# Patient Record
Sex: MALE | Race: WHITE | Employment: OTHER | URBAN - METROPOLITAN AREA
[De-identification: names, ages, dates, MRNs, and addresses within clinical notes are randomized per-mention and may not be internally consistent; named-entity substitution may affect disease eponyms.]

---

## 2017-06-15 ENCOUNTER — OFFICE VISIT (OUTPATIENT)
Dept: CARDIOLOGY CLINIC | Age: 72
End: 2017-06-15

## 2017-06-15 VITALS
HEIGHT: 67 IN | WEIGHT: 167.2 LBS | BODY MASS INDEX: 26.24 KG/M2 | DIASTOLIC BLOOD PRESSURE: 70 MMHG | HEART RATE: 51 BPM | OXYGEN SATURATION: 94 % | SYSTOLIC BLOOD PRESSURE: 125 MMHG

## 2017-06-15 DIAGNOSIS — I49.3 PVC'S (PREMATURE VENTRICULAR CONTRACTIONS): ICD-10-CM

## 2017-06-15 DIAGNOSIS — E78.00 PURE HYPERCHOLESTEROLEMIA: ICD-10-CM

## 2017-06-15 DIAGNOSIS — I50.22 CHRONIC SYSTOLIC CONGESTIVE HEART FAILURE (HCC): ICD-10-CM

## 2017-06-15 DIAGNOSIS — I25.10 CORONARY ARTERY DISEASE INVOLVING NATIVE CORONARY ARTERY OF NATIVE HEART WITHOUT ANGINA PECTORIS: Primary | ICD-10-CM

## 2017-06-15 DIAGNOSIS — I42.9 CARDIOMYOPATHY (HCC): ICD-10-CM

## 2017-06-15 DIAGNOSIS — I10 ESSENTIAL HYPERTENSION: ICD-10-CM

## 2017-06-15 DIAGNOSIS — J43.1 PANLOBULAR EMPHYSEMA (HCC): ICD-10-CM

## 2017-06-15 PROCEDURE — 3017F COLORECTAL CA SCREEN DOC REV: CPT | Performed by: INTERNAL MEDICINE

## 2017-06-15 PROCEDURE — 1036F TOBACCO NON-USER: CPT | Performed by: INTERNAL MEDICINE

## 2017-06-15 PROCEDURE — 3023F SPIROM DOC REV: CPT | Performed by: INTERNAL MEDICINE

## 2017-06-15 PROCEDURE — G8419 CALC BMI OUT NRM PARAM NOF/U: HCPCS | Performed by: INTERNAL MEDICINE

## 2017-06-15 PROCEDURE — G8598 ASA/ANTIPLAT THER USED: HCPCS | Performed by: INTERNAL MEDICINE

## 2017-06-15 PROCEDURE — 99214 OFFICE O/P EST MOD 30 MIN: CPT | Performed by: INTERNAL MEDICINE

## 2017-06-15 PROCEDURE — G8427 DOCREV CUR MEDS BY ELIG CLIN: HCPCS | Performed by: INTERNAL MEDICINE

## 2017-06-15 PROCEDURE — 4040F PNEUMOC VAC/ADMIN/RCVD: CPT | Performed by: INTERNAL MEDICINE

## 2017-06-15 PROCEDURE — 1123F ACP DISCUSS/DSCN MKR DOCD: CPT | Performed by: INTERNAL MEDICINE

## 2017-06-15 PROCEDURE — G8926 SPIRO NO PERF OR DOC: HCPCS | Performed by: INTERNAL MEDICINE

## 2017-06-15 RX ORDER — LEVOTHYROXINE SODIUM 137 UG/1
137 TABLET ORAL
COMMUNITY
End: 2017-11-08 | Stop reason: DRUGHIGH

## 2017-06-15 RX ORDER — AMLODIPINE BESYLATE AND BENAZEPRIL HYDROCHLORIDE 5; 20 MG/1; MG/1
1 CAPSULE ORAL DAILY
COMMUNITY

## 2017-06-15 RX ORDER — FUROSEMIDE 20 MG/1
20 TABLET ORAL DAILY
Qty: 30 TABLET | Refills: 6 | Status: SHIPPED | OUTPATIENT
Start: 2017-06-15 | End: 2017-09-18 | Stop reason: SDUPTHER

## 2017-06-15 RX ORDER — ATORVASTATIN CALCIUM 40 MG/1
40 TABLET, FILM COATED ORAL DAILY
COMMUNITY

## 2017-06-15 RX ORDER — ALBUTEROL SULFATE 90 UG/1
2 AEROSOL, METERED RESPIRATORY (INHALATION) EVERY 6 HOURS PRN
COMMUNITY

## 2017-06-15 RX ORDER — LEVOTHYROXINE SODIUM 0.12 MG/1
125 TABLET ORAL WEEKLY
COMMUNITY

## 2017-06-15 RX ORDER — ALBUTEROL SULFATE 2.5 MG/3ML
2.5 SOLUTION RESPIRATORY (INHALATION) EVERY 6 HOURS PRN
COMMUNITY

## 2017-06-15 RX ORDER — CLOPIDOGREL BISULFATE 75 MG/1
75 TABLET ORAL DAILY
COMMUNITY

## 2017-06-15 RX ORDER — METOPROLOL SUCCINATE 25 MG/1
25 TABLET, EXTENDED RELEASE ORAL DAILY
Qty: 30 TABLET | Refills: 6 | Status: SHIPPED | OUTPATIENT
Start: 2017-06-15 | End: 2017-11-14 | Stop reason: SDUPTHER

## 2017-06-15 ASSESSMENT — ENCOUNTER SYMPTOMS
BLOOD IN STOOL: 0
WHEEZING: 0
EYE REDNESS: 0
ABDOMINAL DISTENTION: 0
COUGH: 0
SHORTNESS OF BREATH: 0

## 2017-09-18 RX ORDER — FUROSEMIDE 20 MG/1
20 TABLET ORAL DAILY
Qty: 30 TABLET | Refills: 0 | Status: SHIPPED | OUTPATIENT
Start: 2017-09-18 | End: 2017-10-02 | Stop reason: SDUPTHER

## 2017-10-02 ENCOUNTER — OFFICE VISIT (OUTPATIENT)
Dept: CARDIOLOGY CLINIC | Age: 72
End: 2017-10-02

## 2017-10-02 VITALS
SYSTOLIC BLOOD PRESSURE: 128 MMHG | BODY MASS INDEX: 25.52 KG/M2 | HEIGHT: 67 IN | HEART RATE: 36 BPM | OXYGEN SATURATION: 91 % | WEIGHT: 162.6 LBS | DIASTOLIC BLOOD PRESSURE: 64 MMHG

## 2017-10-02 DIAGNOSIS — I49.3 PVC'S (PREMATURE VENTRICULAR CONTRACTIONS): ICD-10-CM

## 2017-10-02 DIAGNOSIS — I50.22 CHRONIC SYSTOLIC CONGESTIVE HEART FAILURE (HCC): ICD-10-CM

## 2017-10-02 DIAGNOSIS — I10 ESSENTIAL HYPERTENSION: ICD-10-CM

## 2017-10-02 DIAGNOSIS — I25.10 CORONARY ARTERY DISEASE INVOLVING NATIVE CORONARY ARTERY OF NATIVE HEART WITHOUT ANGINA PECTORIS: ICD-10-CM

## 2017-10-02 DIAGNOSIS — E78.00 PURE HYPERCHOLESTEROLEMIA: Primary | ICD-10-CM

## 2017-10-02 PROCEDURE — G8598 ASA/ANTIPLAT THER USED: HCPCS | Performed by: INTERNAL MEDICINE

## 2017-10-02 PROCEDURE — G8484 FLU IMMUNIZE NO ADMIN: HCPCS | Performed by: INTERNAL MEDICINE

## 2017-10-02 PROCEDURE — G8427 DOCREV CUR MEDS BY ELIG CLIN: HCPCS | Performed by: INTERNAL MEDICINE

## 2017-10-02 PROCEDURE — 4040F PNEUMOC VAC/ADMIN/RCVD: CPT | Performed by: INTERNAL MEDICINE

## 2017-10-02 PROCEDURE — 1123F ACP DISCUSS/DSCN MKR DOCD: CPT | Performed by: INTERNAL MEDICINE

## 2017-10-02 PROCEDURE — 3017F COLORECTAL CA SCREEN DOC REV: CPT | Performed by: INTERNAL MEDICINE

## 2017-10-02 PROCEDURE — 99214 OFFICE O/P EST MOD 30 MIN: CPT | Performed by: INTERNAL MEDICINE

## 2017-10-02 PROCEDURE — 1036F TOBACCO NON-USER: CPT | Performed by: INTERNAL MEDICINE

## 2017-10-02 PROCEDURE — 93000 ELECTROCARDIOGRAM COMPLETE: CPT | Performed by: INTERNAL MEDICINE

## 2017-10-02 PROCEDURE — G8417 CALC BMI ABV UP PARAM F/U: HCPCS | Performed by: INTERNAL MEDICINE

## 2017-10-02 RX ORDER — FUROSEMIDE 20 MG/1
20 TABLET ORAL 2 TIMES DAILY
Qty: 60 TABLET | Refills: 6 | Status: SHIPPED | OUTPATIENT
Start: 2017-10-02 | End: 2018-04-28 | Stop reason: SDUPTHER

## 2017-10-02 ASSESSMENT — ENCOUNTER SYMPTOMS
BLOOD IN STOOL: 0
ABDOMINAL DISTENTION: 0
SHORTNESS OF BREATH: 0
COUGH: 0
EYE REDNESS: 0
WHEEZING: 0

## 2017-10-02 NOTE — PATIENT INSTRUCTIONS
Avoiding Triggers With Heart Failure: Care Instructions  Your Care Instructions  Triggers are anything that make your heart failure flare up. A flare-up is also called \"sudden heart failure\" or \"acute heart failure. \" When you have a flare-up, fluid builds up in your lungs, and you have problems breathing. You might need to go to the hospital. By watching for changes in your condition and avoiding triggers, you can prevent heart failure flare-ups. Follow-up care is a key part of your treatment and safety. Be sure to make and go to all appointments, and call your doctor if you are having problems. It's also a good idea to know your test results and keep a list of the medicines you take. How can you care for yourself at home? Watch for changes in your weight and condition  · Weigh yourself without clothing at the same time each day. Record your weight. Call your doctor if you have sudden weight gain, such as more than 2 to 3 pounds in a day or 5 pounds in a week. (Your doctor may suggest a different range of weight gain.) A sudden weight gain may mean that your heart failure is getting worse. · Keep a daily record of your symptoms. Write down any changes in how you feel, such as new shortness of breath, cough, or problems eating. Also record if your ankles are more swollen than usual and if you feel more tired than usual. Note anything that you ate or did that could have triggered these changes. Limit sodium  Sodium causes your body to hold on to extra water. This may cause your heart failure symptoms to get worse. People get most of their sodium from processed foods. Fast food and restaurant meals also tend to be very high in sodium. · Your doctor may suggest that you limit sodium to 2,000 milligrams (mg) a day or less. That is less than 1 teaspoon of salt a day, including all the salt you eat in cooking or in packaged foods. · Read food labels on cans and food packages.  They tell you how much sodium you get in one serving. Check the serving size. If you eat more than one serving, you are getting more sodium. · Be aware that sodium can come in forms other than salt, including monosodium glutamate (MSG), sodium citrate, and sodium bicarbonate (baking soda). MSG is often added to Asian food. You can sometimes ask for food without MSG or salt. · Slowly reducing salt will help you adjust to the taste. Take the salt shaker off the table. · Flavor your food with garlic, lemon juice, onion, vinegar, herbs, and spices instead of salt. Do not use soy sauce, steak sauce, onion salt, garlic salt, mustard, or ketchup on your food, unless it is labeled \"low-sodium\" or \"low-salt. \"  · Make your own salad dressings, sauces, and ketchup without adding salt. · Use fresh or frozen ingredients, instead of canned ones, whenever you can. Choose low-sodium canned goods. · Eat less processed food and food from restaurants, including fast food. Exercise as directed  Moderate, regular exercise is very good for your heart. It improves your blood flow and helps control your weight. But too much exercise can stress your heart and cause a heart failure flare-up. · Check with your doctor before you start an exercise program.  · Walking is an easy way to get exercise. Start out slowly. Gradually increase the length and pace of your walk. Swimming, riding a bike, and using a treadmill are also good forms of exercise. · When you exercise, watch for signs that your heart is working too hard. You are pushing yourself too hard if you cannot talk while you are exercising. If you become short of breath or dizzy or have chest pain, stop, sit down, and rest.  · Do not exercise when you do not feel well. Take medicines correctly  · Take your medicines exactly as prescribed. Call your doctor if you think you are having a problem with your medicine. · Make a list of all the medicines you take.  Include those prescribed to you by other doctors and any over-the-counter medicines, vitamins, or supplements you take. Take this list with you when you go to any doctor. · Take your medicines at the same time every day. It may help you to post a list of all the medicines you take every day and what time of day you take them. · Make taking your medicine as simple as you can. Plan times to take your medicines when you are doing other things, such as eating a meal or getting ready for bed. This will make it easier to remember to take your medicines. · Get organized. Use helpful tools, such as daily or weekly pill containers. When should you call for help? Call 911 if you have symptoms of sudden heart failure such as:  · You have severe trouble breathing. · You cough up pink, foamy mucus. · You have a new irregular or rapid heartbeat. Call your doctor now or seek immediate medical care if:  · You have new or increased shortness of breath. · You are dizzy or lightheaded, or you feel like you may faint. · You have sudden weight gain, such as more than 2 to 3 pounds in a day or 5 pounds in a week. (Your doctor may suggest a different range of weight gain.)  · You have increased swelling in your legs, ankles, or feet. · You are suddenly so tired or weak that you cannot do your usual activities. Watch closely for changes in your health, and be sure to contact your doctor if you develop new symptoms. Where can you learn more? Go to https://[employer]. org and sign in to your "[employer] account. Enter X279 in the [employer] box to learn more about \"Avoiding Triggers With Heart Failure: Care Instructions. \"     If you do not have an account, please click on the \"Sign Up Now\" link. Current as of: February 23, 2017  Content Version: 11.3  © 7091-7890 [employer]. Care instructions adapted under license by [facility] ([facility]).  If you have questions about a medical condition or this instruction, always ask your healthcare professional.

## 2017-10-02 NOTE — LETTER
[address] Cardiology - [address] [facility] [address]  [address]   [address] [address]  Phone: [phone]  Fax: [phone]    Mady Palacio MD        October 4, 2017     Shaneka Judge MD, MD  [address] [facility]  [address] [address]    Patient: Bessy Jean Baptiste  MR Number: [id number]  YOB: 1945  Date of Visit: 10/2/2017    Dear Dr. Shaneka Judge MD:    HPI He states that he was hospitalized overnight at the [facility] for COPD exacerbation and possible CHF. He states that he was on vacation and not watching his diet or weighing himself. Troponin was normal. ProBNP 629. He denies chest pain, palpitations, dizziness, syncope, leg swelling and worsening chronic dyspnea. Assessment:       CAD (coronary artery disease)     s/p CABG X4 TCH 1994.  Cardiomyopathy ([address]     Echo 6/2017 LVEF 40-45%, grade I DD, mildly reduced RVSF, mild MR.     CHF (congestive heart failure) (HCC)     chronic systolic. ProBNP 3060. CXR 5/2017 mild CHF. ProBNP 910 9/2017    COPD (chronic obstructive pulmonary disease) (HCC)     Severe. Followed by Dr. Victor M Gordon.  HLD (hyperlipidemia)     taking statin. LDL <40    HTN (hypertension)- controlled.  PVC's (premature ventricular contractions)     asymptomatic. Mg, TSH levels normal. K level normal. EKG 10/2017 NSR, bigeminal PVC's. Consider ablation. SVT while hospitalized. Plan:      CHF appears to be compensated. No angina. HTN controlled. Advised pt to weigh himself daily and limit salt intake. Advised to increase lasix to BID prn for weight increase. Will arrange 24 hour HM to evaluate PVC burden. Fasting lipid profile, CMP, proBNP prior to next visit. If you have questions, please do not hesitate to call me. I look forward to following Lance Dubon along with you.     Sincerely,        Mady Palacio MD

## 2017-10-02 NOTE — MR AVS SNAPSHOT
Additional Information about your Body Mass Index (BMI)           Your BMI as listed above is considered overweight (25.0-29.9). BMI is an estimate of body fat, calculated from your height and weight. The higher your BMI, the greater your risk of heart disease, high blood pressure, type 2 diabetes, stroke, gallstones, arthritis, sleep apnea, and certain cancers. BMI is not perfect. It may overestimate body fat in athletes and people who are more muscular. If your body fat is high you can improve your BMI by decreasing your calorie intake and becoming more physically active. Learn more at: [email or web address]          Instructions         Avoiding Triggers With Heart Failure: Care Instructions  Your Care Instructions  Triggers are anything that make your heart failure flare up. A flare-up is also called \"sudden heart failure\" or \"acute heart failure. \" When you have a flare-up, fluid builds up in your lungs, and you have problems breathing. You might need to go to the hospital. By watching for changes in your condition and avoiding triggers, you can prevent heart failure flare-ups. Follow-up care is a key part of your treatment and safety. Be sure to make and go to all appointments, and call your doctor if you are having problems. It's also a good idea to know your test results and keep a list of the medicines you take. How can you care for yourself at home? Watch for changes in your weight and condition  · Weigh yourself without clothing at the same time each day. Record your weight. Call your doctor if you have sudden weight gain, such as more than 2 to 3 pounds in a day or 5 pounds in a week. (Your doctor may suggest a different range of weight gain.) A sudden weight gain may mean that your heart failure is getting worse. · Keep a daily record of your symptoms. Write down any changes in how you feel, such as new shortness of breath, cough, or problems eating.  Also record if your ankles are more swollen than usual and if you feel more tired than usual. Note anything that you ate or did that could have triggered these changes. Limit sodium  Sodium causes your body to hold on to extra water. This may cause your heart failure symptoms to get worse. People get most of their sodium from processed foods. Fast food and restaurant meals also tend to be very high in sodium. · Your doctor may suggest that you limit sodium to 2,000 milligrams (mg) a day or less. That is less than 1 teaspoon of salt a day, including all the salt you eat in cooking or in packaged foods. · Read food labels on cans and food packages. They tell you how much sodium you get in one serving. Check the serving size. If you eat more than one serving, you are getting more sodium. · Be aware that sodium can come in forms other than salt, including monosodium glutamate (MSG), sodium citrate, and sodium bicarbonate (baking soda). MSG is often added to Asian food. You can sometimes ask for food without MSG or salt. · Slowly reducing salt will help you adjust to the taste. Take the salt shaker off the table. · Flavor your food with garlic, lemon juice, onion, vinegar, herbs, and spices instead of salt. Do not use soy sauce, steak sauce, onion salt, garlic salt, mustard, or ketchup on your food, unless it is labeled \"low-sodium\" or \"low-salt. \"  · Make your own salad dressings, sauces, and ketchup without adding salt. · Use fresh or frozen ingredients, instead of canned ones, whenever you can. Choose low-sodium canned goods. · Eat less processed food and food from restaurants, including fast food. Exercise as directed  Moderate, regular exercise is very good for your heart. It improves your blood flow and helps control your weight. But too much exercise can stress your heart and cause a heart failure flare-up.   · Check with your doctor before you start an exercise program. · Walking is an easy way to get exercise. Start out slowly. Gradually increase the length and pace of your walk. Swimming, riding a bike, and using a treadmill are also good forms of exercise. · When you exercise, watch for signs that your heart is working too hard. You are pushing yourself too hard if you cannot talk while you are exercising. If you become short of breath or dizzy or have chest pain, stop, sit down, and rest.  · Do not exercise when you do not feel well. Take medicines correctly  · Take your medicines exactly as prescribed. Call your doctor if you think you are having a problem with your medicine. · Make a list of all the medicines you take. Include those prescribed to you by other doctors and any over-the-counter medicines, vitamins, or supplements you take. Take this list with you when you go to any doctor. · Take your medicines at the same time every day. It may help you to post a list of all the medicines you take every day and what time of day you take them. · Make taking your medicine as simple as you can. Plan times to take your medicines when you are doing other things, such as eating a meal or getting ready for bed. This will make it easier to remember to take your medicines. · Get organized. Use helpful tools, such as daily or weekly pill containers. When should you call for help? Call 911 if you have symptoms of sudden heart failure such as:  · You have severe trouble breathing. · You cough up pink, foamy mucus. · You have a new irregular or rapid heartbeat. Call your doctor now or seek immediate medical care if:  · You have new or increased shortness of breath. · You are dizzy or lightheaded, or you feel like you may faint. · You have sudden weight gain, such as more than 2 to 3 pounds in a day or 5 pounds in a week. (Your doctor may suggest a different range of weight gain.)  · You have increased swelling in your legs, ankles, or feet. furosemide (LASIX) 20 MG tablet Take 1 tablet by mouth 2 times daily    albuterol (PROVENTIL) (2.5 MG/3ML) 0.083% nebulizer solution Take 2.5 mg by nebulization every 6 hours as needed for Wheezing    albuterol sulfate  (90 Base) MCG/ACT inhaler Inhale 2 puffs into the lungs every 6 hours as needed for Wheezing    Roflumilast (DALIRESP PO) Take by mouth    clopidogrel (PLAVIX) 75 MG tablet Take 75 mg by mouth daily    levothyroxine (SYNTHROID) 137 MCG tablet Take 137 mcg by mouth 5 days a week    levothyroxine (SYNTHROID) 125 MCG tablet Take 125 mcg by mouth 2 days a week    atorvastatin (LIPITOR) 40 MG tablet Take 40 mg by mouth daily    amLODIPine-benazepril (LOTREL) 5-20 MG per capsule Take 1 capsule by mouth daily    aspirin 81 MG tablet Take 81 mg by mouth daily    metoprolol succinate (TOPROL XL) 25 MG extended release tablet Take 1 tablet by mouth daily      Allergies              Tetanus Toxoids       We Ordered/Performed the following           EKG 12 Lead          Result Summary for EKG 12 Lead      Result Information     Status          Edited Result - FINAL (Resulted: 10/2/2017  4:21 PM)           10/2/2017  4:30 PM      Scans on Order [id number]            ECG on 10/2/2017  4:22 PM : ECG Report                     Additional Information        Basic Information     Date Of Birth Sex Race Ethnicity Preferred Language    1945 Male White Non-/Non  English      Problem List as of 10/2/2017  Date Reviewed: 10/2/2017                CAD (coronary artery disease)    Cardiomyopathy (HCC)    CHF (congestive heart failure) (HCC)    COPD (chronic obstructive pulmonary disease) (HCC)    HLD (hyperlipidemia)    HTN (hypertension)    PVC's (premature ventricular contractions)      Preventive Care        Date Due    One-time abdominal aortic aneurism (AAA) screening is recommended for all men between the age of 73-68 who have ever smoked 1945

## 2017-10-02 NOTE — PROGRESS NOTES
Subjective:      Patient ID: Zaida Velez is a 71 y.o. male. Reason for visit: f/u CHF  CC: \"I was in the hospital for SOB. \"    HPI He states that he was hospitalized overnight at the [address] [place] [address] for COPD exacerbation and possible CHF. He states that he was on vacation and not watching his diet or weighing himself. Troponin was normal. ProBNP 629. He denies chest pain, palpitations, dizziness, syncope, leg swelling and worsening chronic dyspnea. Review of Systems   Constitutional: Negative for activity change, appetite change, chills, fatigue, fever and unexpected weight change. HENT: Negative for congestion, nosebleeds and tinnitus. Eyes: Negative for redness and visual disturbance. Respiratory: Negative for cough, shortness of breath and wheezing. Cardiovascular: Negative for chest pain, palpitations and leg swelling. Gastrointestinal: Negative for abdominal distention and blood in stool. Genitourinary: Negative for dysuria and hematuria. Musculoskeletal: Negative for gait problem and myalgias. Neurological: Negative for dizziness and speech difficulty. Hematological: Does not bruise/bleed easily. Psychiatric/Behavioral: Negative for behavioral problems and confusion. All other systems reviewed and are negative. Objective:   Physical Exam   Constitutional: He is oriented to person, place, and time. He appears well-developed and well-nourished. HENT:   Head: Normocephalic and atraumatic. Eyes: Conjunctivae are normal. Right eye exhibits no discharge. Left eye exhibits no discharge. Neck: Normal range of motion. Neck supple. Cardiovascular: Normal rate, regular rhythm, normal heart sounds and intact distal pulses. ectopy   Pulmonary/Chest: Effort normal.   Crackles bases   Abdominal: Soft. Bowel sounds are normal.   Musculoskeletal: Normal range of motion. He exhibits no edema. Neurological: He is alert and oriented to person, place, and time.    Skin:

## 2017-10-04 NOTE — COMMUNICATION BODY
HPI He states that he was hospitalized overnight at the [address] [place] [address] for COPD exacerbation and possible CHF. He states that he was on vacation and not watching his diet or weighing himself. Troponin was normal. ProBNP 629. He denies chest pain, palpitations, dizziness, syncope, leg swelling and worsening chronic dyspnea. Assessment:       CAD (coronary artery disease)     s/p CABG X4 TCH 1994.  Cardiomyopathy ([address]     Echo 6/2017 LVEF 40-45%, grade I DD, mildly reduced RVSF, mild MR.     CHF (congestive heart failure) (HCC)     chronic systolic. ProBNP 3060. CXR 5/2017 mild CHF. ProBNP 910 9/2017    COPD (chronic obstructive pulmonary disease) ([facility])     Severe. Followed by Dr. Raquel Cooks.  HLD (hyperlipidemia)     taking statin. LDL <40    HTN (hypertension)- controlled.  PVC's (premature ventricular contractions)     asymptomatic. Mg, TSH levels normal. K level normal. EKG 10/2017 NSR, bigeminal PVC's. Consider ablation. SVT while hospitalized. Plan:      CHF appears to be compensated. No angina. HTN controlled. Advised pt to weigh himself daily and limit salt intake. Advised to increase lasix to BID prn for weight increase. Will arrange 24 hour HM to evaluate PVC burden. Fasting lipid profile, CMP, proBNP prior to next visit.

## 2017-10-23 ENCOUNTER — OFFICE VISIT (OUTPATIENT)
Dept: CARDIOLOGY CLINIC | Age: 72
End: 2017-10-23

## 2017-10-23 VITALS
WEIGHT: 165 LBS | DIASTOLIC BLOOD PRESSURE: 62 MMHG | SYSTOLIC BLOOD PRESSURE: 124 MMHG | HEIGHT: 67 IN | BODY MASS INDEX: 25.9 KG/M2 | HEART RATE: 66 BPM | OXYGEN SATURATION: 92 %

## 2017-10-23 DIAGNOSIS — I25.10 CORONARY ARTERY DISEASE WITH HX OF MYOCARDIAL INFARCT W/O HX OF CABG: ICD-10-CM

## 2017-10-23 DIAGNOSIS — I50.22 CHRONIC SYSTOLIC HF (HEART FAILURE) (HCC): ICD-10-CM

## 2017-10-23 DIAGNOSIS — I10 ESSENTIAL HYPERTENSION: ICD-10-CM

## 2017-10-23 DIAGNOSIS — I25.2 CORONARY ARTERY DISEASE WITH HX OF MYOCARDIAL INFARCT W/O HX OF CABG: ICD-10-CM

## 2017-10-23 DIAGNOSIS — R06.02 SOB (SHORTNESS OF BREATH): ICD-10-CM

## 2017-10-23 DIAGNOSIS — I49.3 PVC (PREMATURE VENTRICULAR CONTRACTION): Primary | ICD-10-CM

## 2017-10-23 DIAGNOSIS — I25.5 ISCHEMIC CARDIOMYOPATHY: ICD-10-CM

## 2017-10-23 DIAGNOSIS — E78.2 MIXED HYPERLIPIDEMIA: ICD-10-CM

## 2017-10-23 PROCEDURE — 1123F ACP DISCUSS/DSCN MKR DOCD: CPT | Performed by: INTERNAL MEDICINE

## 2017-10-23 PROCEDURE — 3017F COLORECTAL CA SCREEN DOC REV: CPT | Performed by: INTERNAL MEDICINE

## 2017-10-23 PROCEDURE — G8427 DOCREV CUR MEDS BY ELIG CLIN: HCPCS | Performed by: INTERNAL MEDICINE

## 2017-10-23 PROCEDURE — G8598 ASA/ANTIPLAT THER USED: HCPCS | Performed by: INTERNAL MEDICINE

## 2017-10-23 PROCEDURE — 99205 OFFICE O/P NEW HI 60 MIN: CPT | Performed by: INTERNAL MEDICINE

## 2017-10-23 PROCEDURE — G8484 FLU IMMUNIZE NO ADMIN: HCPCS | Performed by: INTERNAL MEDICINE

## 2017-10-23 PROCEDURE — G8417 CALC BMI ABV UP PARAM F/U: HCPCS | Performed by: INTERNAL MEDICINE

## 2017-10-23 PROCEDURE — 1036F TOBACCO NON-USER: CPT | Performed by: INTERNAL MEDICINE

## 2017-10-23 PROCEDURE — 4040F PNEUMOC VAC/ADMIN/RCVD: CPT | Performed by: INTERNAL MEDICINE

## 2017-10-23 PROCEDURE — 93000 ELECTROCARDIOGRAM COMPLETE: CPT | Performed by: INTERNAL MEDICINE

## 2017-10-23 NOTE — PATIENT INSTRUCTIONS
while.  Follow-up care is a key part of your treatment and safety. Be sure to make and go to all appointments, and call your doctor if you are having problems. It's also a good idea to know your test results and keep a list of the medicines you take. Where can you learn more? Go to https://chpepiceweb.mapp2link. org and sign in to your Semetric account. Enter A954 in the [facility] box to learn more about \"Learning About Catheter Ablation for Heart Rhythm Problems. \"     If you do not have an account, please click on the \"Sign Up Now\" link. Current as of: July 28, 2016  Content Version: 11.3  © 3053-2590 [employer]. Care instructions adapted under license by [facility] ([facility]). If you have questions about a medical condition or this instruction, always ask your healthcare professional. [place] [address] any warranty or liability for your use of this information. Patient Education        Electrophysiology Study and Catheter Ablation: What to Expect at [address] [place] [address] had an electrophysiology study for a problem with your heartbeat. You may also have had a catheter ablation to try to correct the problem. You may have swelling, bruising, or a small lump around the site where the catheters went into your body. These should go away in 3 to 4 weeks. Do not exercise hard or lift anything heavy for a week. You may be able to go back to work and to your normal routine in 1 or 2 days. This care sheet gives you a general idea about how long it will take for you to recover. But each person recovers at a different pace. Follow the steps below to get better as quickly as possible. How can you care for yourself at home? Activity  · For 1 week, do not lift anything that would make you strain. This may include heavy grocery bags and milk containers, a heavy briefcase or backpack, cat litter or dog food bags, a vacuum , or a child.   · For 1 week, do not exercise hard or do any activity that could strain your blood vessels or the site where the catheters went into your body. · Ask your doctor when it is okay to have sex. · You may shower 24 to 48 hours after the procedure, if your doctor okays it. Pat the incision dry. Do not take a bath for 1 week, or until your doctor tells you it is okay. Diet  · You can eat your normal diet. If your stomach is upset, try bland, low-fat foods like plain rice, broiled chicken, toast, and yogurt. · Drink plenty of fluids (unless your doctor tells you not to). Medicines  · Your doctor will tell you if and when you can restart your medicines. He or she will also give you instructions about taking any new medicines. · If you take blood thinners, such as warfarin (Coumadin), clopidogrel (Plavix), or aspirin, be sure to talk to your doctor. He or she will tell you if and when to start taking those medicines again. Make sure that you understand exactly what your doctor wants you to do. · Ask your doctor if you can take acetaminophen (Tylenol) for pain. Do not take aspirin for 3 days, unless your doctor says it is okay. · Check with your doctor before you take aspirin or anti-inflammatory medicines to reduce pain and swelling. These include ibuprofen (Advil, Motrin) and naproxen (Aleve). · Make sure you know which heart medicines to continue and which ones to stop. Ask your doctor if you are not sure. Catheter site care  · You can remove your bandages the day after the procedure. Follow-up care is a key part of your treatment and safety. Be sure to make and go to all appointments, and call your doctor if you are having problems. It's also a good idea to know your test results and keep a list of the medicines you take. When should you call for help? Call 911 anytime you think you may need emergency care. For example, call if:  · You passed out (lost consciousness). · You have severe trouble breathing.   · You have sudden chest pain and shortness of breath, or you cough up blood. · You have a lot of bleeding from your catheter site. Call your doctor now or seek immediate medical care if:  · You have a fever over 100°F.  · You are bleeding from one of the areas where a catheter was put in.  · You have a fast-growing, painful lump at the catheter site. · You have painful swelling, or bruising larger than a credit card where a catheter was put in.  · You have signs of infection, such as:  ¨ Increased pain, swelling, warmth, or redness. ¨ Red streaks leading from the catheter site. ¨ Pus draining from the catheter site. ¨ A fever. · Your arm or leg is numb or hurts. · Your feet are very cold. · Your heart rhythm problem comes back. · You are dizzy or lightheaded, or you feel like you may faint. Watch closely for any changes in your health, and be sure to contact your doctor if:  · You have questions about the results of your procedure or your treatment plan. Where can you learn more? Go to https://[employer].WorkshopLive. org and sign in to your Roadnet account. Enter [phone] in the [facility] box to learn more about \"Electrophysiology Study and Catheter Ablation: What to Expect at Home. \"     If you do not have an account, please click on the \"Sign Up Now\" link. Current as of: April 3, 2017  Content Version: 11.3  © 2862-3202 [employer]. Care instructions adapted under license by [facility] ([facility]). If you have questions about a medical condition or this instruction, always ask your healthcare professional. [place] [address] any warranty or liability for your use of this information. Patient Education        Electrophysiology Study and Catheter Ablation: Before Your Procedure  What is an electrophysiology study and catheter ablation? An electrophysiology study is a test to see if there is a problem with your heart rhythm and to find out how to fix it. It is also called an EPS.

## 2017-10-23 NOTE — PROGRESS NOTES
furosemide (LASIX) 20 MG tablet Take 1 tablet by mouth 2 times daily 10/2/17  Yes Meghan Sorto MD   albuterol (PROVENTIL) (2.5 MG/3ML) 0.083% nebulizer solution Take 2.5 mg by nebulization every 6 hours as needed for Wheezing   Yes Historical Provider, MD   albuterol sulfate  (90 Base) MCG/ACT inhaler Inhale 2 puffs into the lungs every 6 hours as needed for Wheezing   Yes Historical Provider, MD   Roflumilast (DALIRESP PO) Take by mouth   Yes Historical Provider, MD   clopidogrel (PLAVIX) 75 MG tablet Take 75 mg by mouth daily   Yes Historical Provider, MD   levothyroxine (SYNTHROID) 125 MCG tablet Take 125 mcg by mouth daily    Yes Historical Provider, MD   atorvastatin (LIPITOR) 40 MG tablet Take 40 mg by mouth daily   Yes Historical Provider, MD   amLODIPine-benazepril (LOTREL) 5-20 MG per capsule Take 1 capsule by mouth daily   Yes Historical Provider, MD   aspirin 81 MG tablet Take 81 mg by mouth daily   Yes Historical Provider, MD   metoprolol succinate (TOPROL XL) 25 MG extended release tablet Take 1 tablet by mouth daily 6/15/17  Yes Meghan Sorto MD   levothyroxine (SYNTHROID) 137 MCG tablet Take 137 mcg by mouth 5 days a week    Historical Provider, MD       Social History:   reports that he quit smoking about 22 years ago. He has a 25.00 pack-year smoking history. He has never used smokeless tobacco. He reports that he drinks alcohol. He reports that he does not use drugs. Family History:  family history includes COPD in his mother; Heart Attack in his father. Reviewed.  Denies family history of sudden cardiac death, arrhythmia, premature CAD    Review of System:    · General ROS: negative for - chills, fever   · Psychological ROS: negative for - anxiety or depression  · Ophthalmic ROS: negative for - eye pain or loss of vision  · ENT ROS: negative for - epistaxis, headaches, nasal discharge, sore throat   · Allergy and Immunology ROS: negative for - hives, nasal congestion · Hematological and Lymphatic ROS: negative for - bleeding problems, blood clots, bruising or jaundice  · Endocrine ROS: negative for - skin changes, temperature intolerance or unexpected weight changes  · Respiratory ROS: negative for - cough, hemoptysis, pleuritic pain, sputum changes or wheezing +chronic SOB  · Cardiovascular ROS: Per HPI. · Gastrointestinal ROS: negative for - abdominal pain, blood in stools, diarrhea, hematemesis, melena,  nausea/vomiting or swallowing difficulty/pain  · Genito-Urinary ROS: negative for - dysuria or incontinence  · Musculoskeletal ROS: negative for - joint swelling or muscle pain  · Neurological ROS: negative for - confusion, dizziness, gait disturbance, headaches, numbness/tingling, seizures,  speech problems, tremors, visual changes or weakness  · Dermatological ROS: negative for - rash    Physical Examination:  Vitals:    10/23/17 0829   BP: 124/62   Pulse: 66   SpO2: 92%       · Constitutional: Oriented. No distress. · Head: Normocephalic and atraumatic. · Mouth/Throat: Oropharynx is clear and moist.   · Eyes: Conjunctivae normal. EOM are normal.   · Neck: Normal range of motion. Neck supple. No rigidity. No JVD present. · Cardiovascular: Normal rate, regular rhythm with frequent PVC's, S1&S2 and intact distal pulses. · Pulmonary/Chest: Bilateral respiratory sounds. No wheezes. No rhonchi. · Abdominal: Soft. Bowel sounds present. No distension, No tenderness. · Musculoskeletal: No tenderness. No edema    · Lymphadenopathy: Has no cervical adenopathy. · Neurological: Alert and oriented. Cranial nerve appears intact, No Gross deficit   · Skin: Skin is warm and dry. No rash noted. · Psychiatric: Has a normal mood, affect and behavior     Labs:  Reviewed.    Cr 0.9, K 4.1, TG 66, HDL 63, LDL too low to calc (9/2017[facility])    ECG: reviewed, 10/23/17 SR with frequent PVC's    6  Minute walk test: 6/27/17 [facility])  Comments: Based on above testing patient

## 2017-10-25 ENCOUNTER — HOSPITAL ENCOUNTER (OUTPATIENT)
Dept: NON INVASIVE DIAGNOSTICS | Age: 72
Discharge: HOME | End: 2017-10-25
Attending: INTERNAL MEDICINE | Admitting: INTERNAL MEDICINE

## 2017-10-25 DIAGNOSIS — R06.02 SHORTNESS OF BREATH: ICD-10-CM

## 2017-10-25 LAB
LV EF: 40 %
LVEF MODALITY: NORMAL

## 2017-10-27 ENCOUNTER — TELEPHONE (OUTPATIENT)
Dept: CARDIOLOGY CLINIC | Age: 72
End: 2017-10-27

## 2017-10-27 NOTE — TELEPHONE ENCOUNTER
Procedure scheduled  Wed 11/8/17  1:30pm        Pt to arrive & report to [place] cath lab 12:00pm  Pre procedure labs due before (10/27-11/7)    NPO 6:30am day of. Ok to take all med's in am with sip of water. May hold water pill.  at discharge  If you go home the same day as your procedure someone needs to stay with you overnight. Patient expressed understanding  He stated he had read through the handouts and understood his prep.

## 2017-10-30 ENCOUNTER — TELEPHONE (OUTPATIENT)
Dept: CARDIOLOGY CLINIC | Age: 72
End: 2017-10-30

## 2017-10-30 NOTE — TELEPHONE ENCOUNTER
Patient has been informed of stress test results.   He wanted Lina Junior and Milo Coon that he had labs done at [facility]

## 2017-10-30 NOTE — TELEPHONE ENCOUNTER
Lavon Valenzuelar,     It looks like you tried to call pt this morning. Please refer to Result Notes. Can you please call her back to go over results? Thank you.

## 2017-10-30 NOTE — TELEPHONE ENCOUNTER
His labs are preop for his upcoming ablation. We received them and they were fine. Please let him know.  Thanks

## 2017-11-09 RX ORDER — AMIODARONE HYDROCHLORIDE 200 MG/1
200 TABLET ORAL DAILY
Qty: 90 TABLET | Refills: 2 | Status: SHIPPED | OUTPATIENT
Start: 2017-11-09 | End: 2017-11-20 | Stop reason: SDUPTHER

## 2017-11-14 RX ORDER — METOPROLOL SUCCINATE 25 MG/1
25 TABLET, EXTENDED RELEASE ORAL DAILY
Qty: 90 TABLET | Refills: 3 | Status: SHIPPED | OUTPATIENT
Start: 2017-11-14 | End: 2018-09-25 | Stop reason: SDUPTHER

## 2017-11-20 ENCOUNTER — TELEPHONE (OUTPATIENT)
Dept: CARDIOLOGY CLINIC | Age: 72
End: 2017-11-20

## 2017-11-20 NOTE — TELEPHONE ENCOUNTER
Medication Refill      Medication needing refilled: amiodarone    Doseage of the medication: 200mg    How are you taking this medication (QD, BID, TID, QID, PRN): 1x daily    Patient want a 30 or 90 day supply called in: 90 days    Which Pharmacy are we sending the medication to:    Pharmacy:  [phone] - F [phone]

## 2017-11-21 RX ORDER — AMIODARONE HYDROCHLORIDE 200 MG/1
200 TABLET ORAL DAILY
Qty: 90 TABLET | Refills: 3 | Status: SHIPPED | OUTPATIENT
Start: 2017-11-21 | End: 2018-09-27 | Stop reason: ALTCHOICE

## 2017-12-06 ENCOUNTER — OFFICE VISIT (OUTPATIENT)
Dept: CARDIOLOGY CLINIC | Age: 72
End: 2017-12-06

## 2017-12-06 VITALS
WEIGHT: 161 LBS | DIASTOLIC BLOOD PRESSURE: 66 MMHG | OXYGEN SATURATION: 96 % | SYSTOLIC BLOOD PRESSURE: 122 MMHG | BODY MASS INDEX: 25.27 KG/M2 | HEIGHT: 67 IN | HEART RATE: 60 BPM

## 2017-12-06 DIAGNOSIS — I42.9 CARDIOMYOPATHY, UNSPECIFIED TYPE (HCC): ICD-10-CM

## 2017-12-06 DIAGNOSIS — I25.10 CORONARY ARTERY DISEASE INVOLVING NATIVE CORONARY ARTERY OF NATIVE HEART WITHOUT ANGINA PECTORIS: Primary | ICD-10-CM

## 2017-12-06 DIAGNOSIS — I49.3 PVC (PREMATURE VENTRICULAR CONTRACTION): ICD-10-CM

## 2017-12-06 PROCEDURE — 3017F COLORECTAL CA SCREEN DOC REV: CPT | Performed by: NURSE PRACTITIONER

## 2017-12-06 PROCEDURE — G8417 CALC BMI ABV UP PARAM F/U: HCPCS | Performed by: NURSE PRACTITIONER

## 2017-12-06 PROCEDURE — 1036F TOBACCO NON-USER: CPT | Performed by: NURSE PRACTITIONER

## 2017-12-06 PROCEDURE — 1111F DSCHRG MED/CURRENT MED MERGE: CPT | Performed by: NURSE PRACTITIONER

## 2017-12-06 PROCEDURE — 93000 ELECTROCARDIOGRAM COMPLETE: CPT | Performed by: NURSE PRACTITIONER

## 2017-12-06 PROCEDURE — G8598 ASA/ANTIPLAT THER USED: HCPCS | Performed by: NURSE PRACTITIONER

## 2017-12-06 PROCEDURE — G8484 FLU IMMUNIZE NO ADMIN: HCPCS | Performed by: NURSE PRACTITIONER

## 2017-12-06 PROCEDURE — 4040F PNEUMOC VAC/ADMIN/RCVD: CPT | Performed by: NURSE PRACTITIONER

## 2017-12-06 PROCEDURE — G8427 DOCREV CUR MEDS BY ELIG CLIN: HCPCS | Performed by: NURSE PRACTITIONER

## 2017-12-06 PROCEDURE — 1123F ACP DISCUSS/DSCN MKR DOCD: CPT | Performed by: NURSE PRACTITIONER

## 2017-12-06 PROCEDURE — 99213 OFFICE O/P EST LOW 20 MIN: CPT | Performed by: NURSE PRACTITIONER

## 2017-12-06 NOTE — PROGRESS NOTES
[address]   Electrophysiology   Date: 12/6/2017  CC:    Chief Complaint   Patient presents with    Follow-Up from [address]     s/p Ablation 11/8/17; PVC, CM, CHF, CAD, HTN, HLD, COPD    Shortness of Breath     states he also has COPD,  but is much improved over the last few month     I had the privilege of visiting Wiley Garza in the office. He presents today for follow up after EPS and ablation for high PVC burden. Reports overall feeling better. Patient denies lightheadedness, dizziness, shortness of breath, chest pain, palpitations, orthopnea, edema, presyncope or syncope. HPI: Wiley Garza is a 71 y.o. Past Medical History:   Diagnosis Date    CAD (coronary artery disease)     s/p CABG X4 TCH 1994.  Cardiomyopathy ([facility] [address]     Echo 6/2017 LVEF 40-45%, grade I DD, mildly reduced RVSF, mild MR.     CHF (congestive heart failure) ([facility])     chronic systolic. ProBNP 3060. CXR 5/2017 mild CHF.  COPD (chronic obstructive pulmonary disease) ([facility])     Severe.  HLD (hyperlipidemia)     taking statin    HTN (hypertension)     PVC's (premature ventricular contractions)     asymptomatic. Mg, TSH levels normal. K 5.4. Past Surgical History:   Procedure Laterality Date    ABLATION OF DYSRHYTHMIC FOCUS  11/08/2017    Dr. Hayes Scale AND ADENOIDECTOMY         Allergies:   Allergies   Allergen Reactions    Tetanus Toxoids Swelling       Medication:  Current Outpatient Prescriptions   Medication Sig Dispense Refill    amiodarone (CORDARONE) 200 MG tablet Take 1 tablet by mouth daily 90 tablet 3    metoprolol succinate (TOPROL XL) 25 MG extended release tablet TAKE 1 TABLET BY MOUTH  DAILY 90 tablet 3    Tiotropium Bromide-Olodaterol (STIOLTO RESPIMAT) 2.5-2.5 MCG/ACT AERS Inhale 2.5 mcg into the lungs      furosemide (LASIX) 20 MG negative for  confusion, numbness/tingling, seizures, weakness  · Dermatological ROS: negative for rash, changes in skin color, lesions     Physical Examination:  Ht 5' 7\" (1.702 m)   Wt 161 lb (73 kg) Comment: did not wish to remove shoes  BMI 25.22 kg/m²     · Constitutional: Oriented. No distress. · Head: Normocephalic and atraumatic. · Mouth/Throat: Oropharynx is clear and moist.   · Eyes: Conjunctivae normal. EOM are normal.   · Neck: Normal range of motion. Neck supple. No rigidity. No JVD present. · Cardiovascular: Normal rate, regular rhythm, S1&S2 and intact distal pulses. · Pulmonary/Chest: Bilateral respiratory sounds. No wheezes. No rhonchi. · Abdominal: Soft. Bowel sounds present. No distension, No tenderness. · Musculoskeletal: No tenderness. No edema    · Neurological: Alert and oriented. Cranial nerve appears intact, No Gross deficit   · Skin: Skin is warm and dry. No rash noted. · Psychiatric: Has a normal mood, affect and behavior       Labs:  Lab Results   Component Value Date    CREATININE 0.6 (L) 11/09/2017    BUN 16 11/09/2017     11/09/2017    K 3.6 11/09/2017     11/09/2017    CO2 27 11/09/2017     ECG:  personally reviewed   12/6/17  SR with PACs     GXT:   10/25/17  Summary    Moderately dilated left ventricle with moderately reduced ejection fraction    at 40 %.    Abnormal myocardial perfusion study with primarily fixed distal anterior,    anterior-apical defect and inferior wal defect suggestive of prior infarct      PFTs: 6/22/17 [facility])  PFT is compatible with Severe obstructive lung disease.     24 hour holter monitor: 10/9/17 [facility])  Frequent PVC's- 45% burden     Echo: 3/2015 [facility])  EF 50-55%     Echo: 6/2017   LVEF 40-45%, grade I DD,   mildly reduced RVSF, mild MR.      Cath: 2002 [facility])  There is a patent pedicle LIMA graft to the LAD. There is a patent saphenous vein bypass graft to the circumflex obtuse  marginal branch. There is a patent saphenous

## 2017-12-15 NOTE — TELEPHONE ENCOUNTER
All Plascencia from [facility], NP office called stating this patient has inquired to Estefany about switching his anticoagulant. They said he was currently on Plavix 75 mg daily and the dx states it is for PVC's. Patient inquired with Estefany if Xarelto or Eliquis could potentially be more beneficial for him. Estefany was not sure whether or not this is a good choice for him and would like to check with Dr. Abraham Hoyos on his opinion.      [phone]

## 2018-03-09 ENCOUNTER — HOSPITAL ENCOUNTER (OUTPATIENT)
Dept: NON INVASIVE DIAGNOSTICS | Age: 73
Discharge: HOME | End: 2018-03-09
Attending: NURSE PRACTITIONER | Admitting: NURSE PRACTITIONER

## 2018-03-09 DIAGNOSIS — I42.9 CARDIOMYOPATHY (HCC): ICD-10-CM

## 2018-03-09 LAB
LV EF: 40 %
LVEF MODALITY: NORMAL

## 2018-03-13 ENCOUNTER — OFFICE VISIT (OUTPATIENT)
Dept: CARDIOLOGY CLINIC | Age: 73
End: 2018-03-13

## 2018-03-13 VITALS
OXYGEN SATURATION: 90 % | HEIGHT: 67 IN | DIASTOLIC BLOOD PRESSURE: 70 MMHG | HEART RATE: 68 BPM | SYSTOLIC BLOOD PRESSURE: 130 MMHG | WEIGHT: 164 LBS | BODY MASS INDEX: 25.74 KG/M2

## 2018-03-13 DIAGNOSIS — I49.3 PVC (PREMATURE VENTRICULAR CONTRACTION): ICD-10-CM

## 2018-03-13 DIAGNOSIS — I50.22 CHRONIC SYSTOLIC CONGESTIVE HEART FAILURE (HCC): Primary | ICD-10-CM

## 2018-03-13 DIAGNOSIS — I25.5 ISCHEMIC CARDIOMYOPATHY: ICD-10-CM

## 2018-03-13 DIAGNOSIS — Z79.899 LONG TERM CURRENT USE OF AMIODARONE: ICD-10-CM

## 2018-03-13 PROCEDURE — G8598 ASA/ANTIPLAT THER USED: HCPCS | Performed by: INTERNAL MEDICINE

## 2018-03-13 PROCEDURE — 3017F COLORECTAL CA SCREEN DOC REV: CPT | Performed by: INTERNAL MEDICINE

## 2018-03-13 PROCEDURE — 99214 OFFICE O/P EST MOD 30 MIN: CPT | Performed by: INTERNAL MEDICINE

## 2018-03-13 PROCEDURE — G8417 CALC BMI ABV UP PARAM F/U: HCPCS | Performed by: INTERNAL MEDICINE

## 2018-03-13 PROCEDURE — 1123F ACP DISCUSS/DSCN MKR DOCD: CPT | Performed by: INTERNAL MEDICINE

## 2018-03-13 PROCEDURE — 1036F TOBACCO NON-USER: CPT | Performed by: INTERNAL MEDICINE

## 2018-03-13 PROCEDURE — 4040F PNEUMOC VAC/ADMIN/RCVD: CPT | Performed by: INTERNAL MEDICINE

## 2018-03-13 PROCEDURE — G8484 FLU IMMUNIZE NO ADMIN: HCPCS | Performed by: INTERNAL MEDICINE

## 2018-03-13 PROCEDURE — 93000 ELECTROCARDIOGRAM COMPLETE: CPT | Performed by: INTERNAL MEDICINE

## 2018-03-13 PROCEDURE — G8427 DOCREV CUR MEDS BY ELIG CLIN: HCPCS | Performed by: INTERNAL MEDICINE

## 2018-03-13 RX ORDER — LEVOTHYROXINE SODIUM 112 UG/1
112 TABLET ORAL
COMMUNITY
End: 2019-05-20 | Stop reason: SDUPTHER

## 2018-03-13 ASSESSMENT — ENCOUNTER SYMPTOMS
EYE REDNESS: 0
SHORTNESS OF BREATH: 0
BLOOD IN STOOL: 0
ABDOMINAL DISTENTION: 0
WHEEZING: 0
COUGH: 0

## 2018-03-13 NOTE — LETTER
[address] Cardiology - [address] [facility] [address]  [address]   [address] [address]  Phone: [phone]  Fax: [phone]    Cristiano Corbett MD        March 26, 2018     Aubrie Luna MD, MD  [address]  [address] [address]    Patient: Kassie Orantes  MR Number: [id number]  YOB: 1945  Date of Visit: 3/13/2018    Dear Dr. Aubrie Luna MD:    HPI Kassie Orantes denies chest pain, palpitations, dizziness, syncope, leg swelling and increased dyspnea. Assessment:       CAD (coronary artery disease)     s/p CABG X4 TCH 1994. Nuc GXT 11/2017 fixed distal anterior, anterior-apical and inferior defect suggestive of infarction, LVEF 40%.  Cardiomyopathy ([address]      Ischemic.  CHF (congestive heart failure) ([facility])     chronic systolic. Echo 6/2017 LVEF 40-45%, grade I DD, mildly reduced RVSF, mild MR. ProBNP 3060. CXR 5/2017 mild CHF. ProBNP 910 9/2017. Echo 3/2018 LVEF 40%, mild MR    COPD (chronic obstructive pulmonary disease) ([facility])     Severe. Followed by Dr. John Hein.  HLD (hyperlipidemia)     taking statin. LDL 44.    HTN (hypertension)- controlled.  PVC's (premature ventricular contractions)     asymptomatic. Mg, TSH levels normal. K level normal. EKG 10/2017 NSR, bigeminal PVC's. Consider ablation. 24 hour HM 45% PVC burden. S/p PVC ablation by Dr. Duyen Childress 11/2017. Taking amiodarone. TSH checked last week by PCP per pt. SVT while hospitalized. Plan:      Clinically stable. Currently in regular rhythm. CHF appears to be compensated. No angina. Will arrange 24 hour HM to evaluate PVC burden post ablation. Consider stopping amiodarone after reviewing the monitor. Continue ASA, statin and BB. Risk factor modification also discussed. Fasting lipid profile, CMP, proBNP, TSH prior to reflex (if still taking amiodarone). If you have questions, please do not hesitate to call me.  I look forward to following Renetta Muñoz along with you.     Sincerely,        Dian Conley MD

## 2018-03-13 NOTE — PATIENT INSTRUCTIONS
Patient Education        Premature Heartbeat: Care Instructions  Your Care Instructions    A premature heartbeat happens when the heart beats earlier than it should. This briefly interrupts the heart's rhythm. You do not usually feel the early heartbeat, and the next beat is stronger. To many people, this feels like a skipped heartbeat or a flutter. If you have no heart problems, premature heartbeats are not a cause for concern. Most people have them at some time. They may happen more often if you drink a lot of caffeine or alcohol or are under stress. Usually, no cause for a premature heartbeat is found, and no treatment is needed. Follow-up care is a key part of your treatment and safety. Be sure to make and go to all appointments, and call your doctor if you are having problems. It's also a good idea to know your test results and keep a list of the medicines you take. How can you care for yourself at home? · Limit caffeine and other stimulants if they trigger premature heartbeats. · Reduce stress. Avoid people and places that make you feel anxious, if you can. Learn ways to reduce stress, such as biofeedback, guided imagery, and meditation. · Do not smoke or allow others to smoke around you. If you need help quitting, talk to your doctor about stop-smoking programs and medicines. These can increase your chances of quitting for good. · Get at least 30 minutes of exercise on most days of the week. Walking is a good choice. You also may want to do other activities, such as running, swimming, cycling, or playing tennis or team sports. · Get enough sleep. Keep your room dark and quiet, and try to go to bed at the same time every night. · Limit alcohol to 2 drinks a day for men and 1 drink a day for women. Too much alcohol can cause health problems. If drinking alcohol causes more premature heartbeats, do not drink it. · If your doctor prescribes medicine, take it exactly as prescribed.  Call your doctor if

## 2018-03-13 NOTE — PROGRESS NOTES
amLODIPine-benazepril (LOTREL) 5-20 MG per capsule Take 1 capsule by mouth daily      aspirin 81 MG tablet Take 81 mg by mouth daily       No current facility-administered medications for this visit. Social History     Social History    Marital status:      Spouse name: N/A    Number of children: N/A    Years of education: N/A     Social History Main Topics    Smoking status: Former Smoker     Packs/day: 1.00     Years: 25.00     Quit date: 6/15/1995    Smokeless tobacco: Never Used    Alcohol use Yes      Comment: rarely - maybe q2 months 1 beer    Drug use: No    Sexual activity: Not Asked     Other Topics Concern    None     Social History Narrative    None     Past Surgical History:   Procedure Laterality Date    ABLATION OF DYSRHYTHMIC FOCUS  11/08/2017    Dr. Arevalo Riding SURGERY      TONSILLECTOMY AND ADENOIDECTOMY       Allergies   Allergen Reactions    Tetanus Toxoids Swelling     Family History   Problem Relation Age of Onset    COPD Mother     Heart Attack Father      Recent labs and imaging reviewed. Assessment:       CAD (coronary artery disease)     s/p CABG X4 TCH 1994. Nuc GXT 11/2017 fixed distal anterior, anterior-apical and inferior defect suggestive of infarction, LVEF 40%.  Cardiomyopathy ([address]      Ischemic.  CHF (congestive heart failure) ([facility])     chronic systolic. Echo 6/2017 LVEF 40-45%, grade I DD, mildly reduced RVSF, mild MR. ProBNP 3060. CXR 5/2017 mild CHF. ProBNP 910 9/2017. Echo 3/2018 LVEF 40%, mild MR    COPD (chronic obstructive pulmonary disease) ([facility])     Severe. Followed by Dr. Hanny Sanders.  HLD (hyperlipidemia)     taking statin. LDL 44.    HTN (hypertension)- controlled.  PVC's (premature ventricular contractions)     asymptomatic. Mg, TSH levels normal. K level normal. EKG 10/2017 NSR, bigeminal PVC's.  Consider ablation. 24 hour HM 45% PVC burden. S/p PVC ablation by Dr. Silva Patton 11/2017. Taking amiodarone. TSH checked last week by PCP per pt. SVT while hospitalized. Plan:      Clinically stable. Currently in regular rhythm. CHF appears to be compensated. No angina. Will arrange 24 hour HM to evaluate PVC burden post ablation. Consider stopping amiodarone after reviewing the monitor. Continue ASA, statin and BB. Risk factor modification also discussed. Fasting lipid profile, CMP, proBNP, TSH prior to reflex (if still taking amiodarone).

## 2018-03-14 ENCOUNTER — OFFICE VISIT (OUTPATIENT)
Dept: CARDIOLOGY CLINIC | Age: 73
End: 2018-03-14

## 2018-03-14 VITALS
OXYGEN SATURATION: 90 % | SYSTOLIC BLOOD PRESSURE: 100 MMHG | WEIGHT: 164 LBS | DIASTOLIC BLOOD PRESSURE: 64 MMHG | BODY MASS INDEX: 25.74 KG/M2 | HEART RATE: 59 BPM | HEIGHT: 67 IN

## 2018-03-14 DIAGNOSIS — I25.5 ISCHEMIC CARDIOMYOPATHY: Primary | ICD-10-CM

## 2018-03-14 DIAGNOSIS — I49.3 PVC (PREMATURE VENTRICULAR CONTRACTION): ICD-10-CM

## 2018-03-14 DIAGNOSIS — I50.22 CHRONIC SYSTOLIC CONGESTIVE HEART FAILURE (HCC): ICD-10-CM

## 2018-03-14 DIAGNOSIS — I10 ESSENTIAL HYPERTENSION: ICD-10-CM

## 2018-03-14 PROCEDURE — 1036F TOBACCO NON-USER: CPT | Performed by: NURSE PRACTITIONER

## 2018-03-14 PROCEDURE — G8598 ASA/ANTIPLAT THER USED: HCPCS | Performed by: NURSE PRACTITIONER

## 2018-03-14 PROCEDURE — G8417 CALC BMI ABV UP PARAM F/U: HCPCS | Performed by: NURSE PRACTITIONER

## 2018-03-14 PROCEDURE — G8427 DOCREV CUR MEDS BY ELIG CLIN: HCPCS | Performed by: NURSE PRACTITIONER

## 2018-03-14 PROCEDURE — 1123F ACP DISCUSS/DSCN MKR DOCD: CPT | Performed by: NURSE PRACTITIONER

## 2018-03-14 PROCEDURE — 4040F PNEUMOC VAC/ADMIN/RCVD: CPT | Performed by: NURSE PRACTITIONER

## 2018-03-14 PROCEDURE — G8484 FLU IMMUNIZE NO ADMIN: HCPCS | Performed by: NURSE PRACTITIONER

## 2018-03-14 PROCEDURE — 3017F COLORECTAL CA SCREEN DOC REV: CPT | Performed by: NURSE PRACTITIONER

## 2018-03-14 PROCEDURE — 99213 OFFICE O/P EST LOW 20 MIN: CPT | Performed by: NURSE PRACTITIONER

## 2018-04-30 RX ORDER — FUROSEMIDE 20 MG/1
TABLET ORAL
Qty: 60 TABLET | Refills: 5 | Status: SHIPPED | OUTPATIENT
Start: 2018-04-30 | End: 2019-05-20 | Stop reason: DRUGHIGH

## 2018-09-25 ASSESSMENT — ENCOUNTER SYMPTOMS
COUGH: 0
WHEEZING: 0
SHORTNESS OF BREATH: 0
ABDOMINAL DISTENTION: 0
BLOOD IN STOOL: 0
EYE REDNESS: 0

## 2018-09-25 NOTE — PROGRESS NOTES
Psychiatric: He has a normal mood and affect. His behavior is normal.   Nursing note and vitals reviewed. Blood pressure 110/60, pulse 90, height 5' 7\" (1.702 m), weight 168 lb 6.4 oz (76.4 kg), SpO2 90 %. Vitals:    09/27/18 1354   BP: 110/60   Site: Left Upper Arm   Position: Sitting   Cuff Size: Medium Adult   Pulse: 90   SpO2: 90%   Weight: 168 lb 6.4 oz (76.4 kg)   Height: 5' 7\" (1.702 m)     Body mass index is 26.38 kg/m². Wt Readings from Last 3 Encounters:   09/27/18 168 lb 6.4 oz (76.4 kg)   03/14/18 164 lb (74.4 kg)   03/13/18 164 lb (74.4 kg)     BP Readings from Last 3 Encounters:   09/27/18 110/60   03/14/18 100/64   03/13/18 130/70        Vitals:    09/27/18 1354   BP: 110/60   Site: Left Upper Arm   Position: Sitting   Cuff Size: Medium Adult   Pulse: 90   SpO2: 90%   Weight: 168 lb 6.4 oz (76.4 kg)   Height: 5' 7\" (1.702 m)     Body mass index is 26.38 kg/m².      Wt Readings from Last 3 Encounters:   09/27/18 168 lb 6.4 oz (76.4 kg)   03/14/18 164 lb (74.4 kg)   03/13/18 164 lb (74.4 kg)     BP Readings from Last 3 Encounters:   09/27/18 110/60   03/14/18 100/64   03/13/18 130/70        Current Outpatient Prescriptions   Medication Sig Dispense Refill    metoprolol succinate (TOPROL XL) 25 MG extended release tablet TAKE 1 TABLET BY MOUTH  DAILY 90 tablet 3    furosemide (LASIX) 20 MG tablet TAKE 1 TABLET BY MOUTH TWO  TIMES DAILY 60 tablet 5    levothyroxine (SYNTHROID) 112 MCG tablet Take 112 mcg by mouth Six times weekly      Tiotropium Bromide-Olodaterol (STIOLTO RESPIMAT) 2.5-2.5 MCG/ACT AERS Inhale 2.5 mcg into the lungs      albuterol (PROVENTIL) (2.5 MG/3ML) 0.083% nebulizer solution Take 2.5 mg by nebulization every 6 hours as needed for Wheezing      albuterol sulfate  (90 Base) MCG/ACT inhaler Inhale 2 puffs into the lungs every 6 hours as needed for Wheezing      clopidogrel (PLAVIX) 75 MG tablet Take 75 mg by mouth daily      levothyroxine (SYNTHROID) 125 MCG (premature ventricular contractions)     asymptomatic. Mg, TSH levels normal. K level normal. EKG 10/2017 NSR, bigeminal PVC's. Consider ablation. 24 hour HM 45% PVC burden. S/p PVC ablation by Dr. Delores Shah 11/2017. Taking amiodarone. TSH checked last week by PCP per pt. SVT while hospitalized. Plan:      Clinically stable. Currently in regular rhythm. CHF appears to be compensated. No angina. Shortness of breath improved. Continue ASA, statin and BB. Risk factor modification also discussed. Continue daily aerobic activity of of bike riding. I have instructed him to avoid extreme temperatures. Fasting lipid profile, CMP, proBNP,    Follow up in 6 months. This note was scribed in the presence of Dr Lizzy Whipple, by Christi Silva RN      The scribes documentation has been prepared under my direction and personally reviewed by me in its entirety. I confirm that the note above accurately reflects all work, treatment, procedures, and medical decision making performed by me.

## 2018-09-26 RX ORDER — METOPROLOL SUCCINATE 25 MG/1
25 TABLET, EXTENDED RELEASE ORAL DAILY
Qty: 90 TABLET | Refills: 3 | Status: SHIPPED | OUTPATIENT
Start: 2018-09-26 | End: 2019-09-25 | Stop reason: SDUPTHER

## 2018-09-27 ENCOUNTER — OFFICE VISIT (OUTPATIENT)
Dept: CARDIOLOGY CLINIC | Age: 73
End: 2018-09-27
Payer: MEDICARE

## 2018-09-27 VITALS
DIASTOLIC BLOOD PRESSURE: 60 MMHG | WEIGHT: 168.4 LBS | HEART RATE: 90 BPM | HEIGHT: 67 IN | OXYGEN SATURATION: 90 % | SYSTOLIC BLOOD PRESSURE: 110 MMHG | BODY MASS INDEX: 26.43 KG/M2

## 2018-09-27 DIAGNOSIS — J44.9 CHRONIC OBSTRUCTIVE PULMONARY DISEASE, UNSPECIFIED COPD TYPE (HCC): ICD-10-CM

## 2018-09-27 DIAGNOSIS — E78.2 MIXED HYPERLIPIDEMIA: ICD-10-CM

## 2018-09-27 DIAGNOSIS — I25.5 ISCHEMIC CARDIOMYOPATHY: ICD-10-CM

## 2018-09-27 DIAGNOSIS — I50.22 CHRONIC SYSTOLIC HEART FAILURE (HCC): ICD-10-CM

## 2018-09-27 DIAGNOSIS — I25.10 CORONARY ARTERY DISEASE INVOLVING NATIVE CORONARY ARTERY OF NATIVE HEART WITHOUT ANGINA PECTORIS: Primary | ICD-10-CM

## 2018-09-27 DIAGNOSIS — I10 ESSENTIAL HYPERTENSION: ICD-10-CM

## 2018-09-27 PROCEDURE — 3017F COLORECTAL CA SCREEN DOC REV: CPT | Performed by: INTERNAL MEDICINE

## 2018-09-27 PROCEDURE — 3023F SPIROM DOC REV: CPT | Performed by: INTERNAL MEDICINE

## 2018-09-27 PROCEDURE — 1036F TOBACCO NON-USER: CPT | Performed by: INTERNAL MEDICINE

## 2018-09-27 PROCEDURE — G8428 CUR MEDS NOT DOCUMENT: HCPCS | Performed by: INTERNAL MEDICINE

## 2018-09-27 PROCEDURE — G8598 ASA/ANTIPLAT THER USED: HCPCS | Performed by: INTERNAL MEDICINE

## 2018-09-27 PROCEDURE — 1101F PT FALLS ASSESS-DOCD LE1/YR: CPT | Performed by: INTERNAL MEDICINE

## 2018-09-27 PROCEDURE — G8926 SPIRO NO PERF OR DOC: HCPCS | Performed by: INTERNAL MEDICINE

## 2018-09-27 PROCEDURE — 4040F PNEUMOC VAC/ADMIN/RCVD: CPT | Performed by: INTERNAL MEDICINE

## 2018-09-27 PROCEDURE — 1123F ACP DISCUSS/DSCN MKR DOCD: CPT | Performed by: INTERNAL MEDICINE

## 2018-09-27 PROCEDURE — 99214 OFFICE O/P EST MOD 30 MIN: CPT | Performed by: INTERNAL MEDICINE

## 2018-09-27 PROCEDURE — G8417 CALC BMI ABV UP PARAM F/U: HCPCS | Performed by: INTERNAL MEDICINE

## 2018-09-27 NOTE — PATIENT INSTRUCTIONS
[employer], and be sure to contact your doctor if:    · You would like help planning heart-healthy meals. Where can you learn more? Go to [email or web address] org and sign in to your [employer] account. Enter V137 in the Beyond the Rack box to learn more about \"Heart-Healthy Diet: Care Instructions. \"     If you do not have an account, please click on the \"Sign Up Now\" link. Current as of: December 6, 2017  Content Version: 11.7  © 4434-7254 [employer]. Care instructions adapted under license by [facility] ([facility]). If you have questions about a medical condition or this instruction, always ask your healthcare professional. [address] any warranty or liability for your use of this information.

## 2018-09-27 NOTE — LETTER
[facility] Cardiology - [address] [facility] [address]  [address]   [address] [address]  Phone: [phone]  Fax: [phone]    Mike De Leon MD        October 4, 2018     Marisol Briones MD, MD  [address] [facility] [address] [facility]  [address] [address]    Patient: Ros Cary  MR Number: [id number]  YOB: 1945  Date of Visit: 9/27/2018    Dear Dr. Marisol Briones MD:     HPI Ros Cary today he is here to follow up. He has stopped taking Amiodarone and he says that his shortness of breath has improved. He is trying to adhere to a low sodium diet. He is bike riding daily. Denies any dyspnea, chest pain, palpitations and dizziness. Assessment:       CAD (coronary artery disease)     s/p CABG X4 TCH 1994. Nuc GXT 11/2017 fixed distal anterior, anterior-apical and inferior defect suggestive of infarction, LVEF 40%.  Cardiomyopathy ([address]      Ischemic.  CHF (congestive heart failure) ([facility])     chronic systolic. Echo 6/2017 LVEF 40-45%, grade I DD, mildly reduced RVSF, mild MR. ProBNP 3060. CXR 5/2017 mild CHF. ProBNP 910 9/2017. Echo 3/2018 LVEF 40%, mild MR    COPD (chronic obstructive pulmonary disease) ([facility])     Severe. Followed by Dr. Melani Lagos.  HLD (hyperlipidemia)     taking statin. LDL 44.    HTN (hypertension)- controlled.  PVC's (premature ventricular contractions)     asymptomatic. Mg, TSH levels normal. K level normal. EKG 10/2017 NSR, bigeminal PVC's. Consider ablation. 24 hour HM 45% PVC burden. S/p PVC ablation by Dr. Giana Laird 11/2017. Taking amiodarone. TSH checked last week by PCP per pt. SVT while hospitalized. Plan:      Clinically stable. Currently in regular rhythm. CHF appears to be compensated. No angina. Shortness of breath improved. Continue ASA, statin and BB. Risk factor modification also discussed. Continue daily aerobic activity of of bike riding.  I have instructed him to avoid extreme

## 2018-10-04 NOTE — COMMUNICATION BODY
 CAD (coronary artery disease)     s/p CABG X4 TCH 1994. Nuc GXT 11/2017 fixed distal anterior, anterior-apical and inferior defect suggestive of infarction, LVEF 40%.  Cardiomyopathy ([address]      Ischemic.  CHF (congestive heart failure) ([facility])     chronic systolic. Echo 6/2017 LVEF 40-45%, grade I DD, mildly reduced RVSF, mild MR. ProBNP 3060. CXR 5/2017 mild CHF. ProBNP 910 9/2017. Echo 3/2018 LVEF 40%, mild MR    COPD (chronic obstructive pulmonary disease) ([facility])     Severe. Followed by Dr. Jordan Morillo.  HLD (hyperlipidemia)     taking statin. LDL 44.    HTN (hypertension)- controlled.  PVC's (premature ventricular contractions)     asymptomatic. Mg, TSH levels normal. K level normal. EKG 10/2017 NSR, bigeminal PVC's. Consider ablation. 24 hour HM 45% PVC burden. S/p PVC ablation by Dr. Elmer Hutchins 11/2017. Taking amiodarone. TSH checked last week by PCP per pt. SVT while hospitalized. Plan:      Clinically stable. Currently in regular rhythm. CHF appears to be compensated. No angina. Shortness of breath improved. Continue ASA, statin and BB. Risk factor modification also discussed. Continue daily aerobic activity of of bike riding. I have instructed him to avoid extreme temperatures. Fasting lipid profile, CMP, proBNP,    Follow up in 6 months. This note was scribed in the presence of Dr Monica Abdullahi, by Domenico Jim RN      The scribes documentation has been prepared under my direction and personally reviewed by me in its entirety. I confirm that the note above accurately reflects all work, treatment, procedures, and medical decision making performed by me.

## 2019-05-13 ENCOUNTER — TELEPHONE (OUTPATIENT)
Dept: CARDIOLOGY CLINIC | Age: 74
End: 2019-05-13

## 2019-05-20 ENCOUNTER — OFFICE VISIT (OUTPATIENT)
Dept: CARDIOLOGY CLINIC | Age: 74
End: 2019-05-20
Payer: MEDICARE

## 2019-05-20 VITALS
BODY MASS INDEX: 27.44 KG/M2 | OXYGEN SATURATION: 89 % | HEART RATE: 58 BPM | DIASTOLIC BLOOD PRESSURE: 65 MMHG | HEIGHT: 67 IN | SYSTOLIC BLOOD PRESSURE: 115 MMHG | WEIGHT: 174.8 LBS

## 2019-05-20 DIAGNOSIS — I49.3 PVC (PREMATURE VENTRICULAR CONTRACTION): ICD-10-CM

## 2019-05-20 DIAGNOSIS — I50.22 CHRONIC SYSTOLIC CONGESTIVE HEART FAILURE (HCC): ICD-10-CM

## 2019-05-20 DIAGNOSIS — I25.5 ISCHEMIC CARDIOMYOPATHY: ICD-10-CM

## 2019-05-20 DIAGNOSIS — Z79.899 LONG TERM CURRENT USE OF AMIODARONE: ICD-10-CM

## 2019-05-20 DIAGNOSIS — I25.10 CORONARY ARTERY DISEASE INVOLVING NATIVE CORONARY ARTERY OF NATIVE HEART WITHOUT ANGINA PECTORIS: Primary | ICD-10-CM

## 2019-05-20 DIAGNOSIS — E78.00 PURE HYPERCHOLESTEROLEMIA: ICD-10-CM

## 2019-05-20 PROCEDURE — G8427 DOCREV CUR MEDS BY ELIG CLIN: HCPCS | Performed by: INTERNAL MEDICINE

## 2019-05-20 PROCEDURE — 3017F COLORECTAL CA SCREEN DOC REV: CPT | Performed by: INTERNAL MEDICINE

## 2019-05-20 PROCEDURE — 1036F TOBACCO NON-USER: CPT | Performed by: INTERNAL MEDICINE

## 2019-05-20 PROCEDURE — G8598 ASA/ANTIPLAT THER USED: HCPCS | Performed by: INTERNAL MEDICINE

## 2019-05-20 PROCEDURE — G8417 CALC BMI ABV UP PARAM F/U: HCPCS | Performed by: INTERNAL MEDICINE

## 2019-05-20 PROCEDURE — 1123F ACP DISCUSS/DSCN MKR DOCD: CPT | Performed by: INTERNAL MEDICINE

## 2019-05-20 PROCEDURE — 4040F PNEUMOC VAC/ADMIN/RCVD: CPT | Performed by: INTERNAL MEDICINE

## 2019-05-20 PROCEDURE — 99214 OFFICE O/P EST MOD 30 MIN: CPT | Performed by: INTERNAL MEDICINE

## 2019-05-20 RX ORDER — FUROSEMIDE 20 MG/1
TABLET ORAL
Qty: 180 TABLET | Refills: 2 | Status: SHIPPED | OUTPATIENT
Start: 2019-05-20 | End: 2019-11-25

## 2019-05-20 RX ORDER — FUROSEMIDE 20 MG/1
20 TABLET ORAL DAILY
COMMUNITY
End: 2020-05-04

## 2019-05-20 ASSESSMENT — ENCOUNTER SYMPTOMS
COUGH: 0
EYE REDNESS: 0
ABDOMINAL DISTENTION: 0
SHORTNESS OF BREATH: 0
WHEEZING: 0
BLOOD IN STOOL: 0

## 2019-05-20 NOTE — PATIENT INSTRUCTIONS
Patient Education        Premature Heartbeat: Care Instructions  Your Care Instructions    A premature heartbeat happens when the heart beats earlier than it should. This briefly interrupts the heart's rhythm. You do not usually feel the early heartbeat, and the next beat is stronger. To many people, this feels like a skipped heartbeat or a flutter. This heartbeat is also called a premature ventricular contraction (PVC). If you have no heart problems, premature heartbeats that happen once in a while are not a cause for concern. Most people have them at some time. They may happen more often if you drink a lot of caffeine or alcohol or are under stress. Usually, no cause for a premature heartbeat is found, and no treatment is needed. Some people may take medicine to prevent these heartbeats and to relieve symptoms. Follow-up care is a key part of your treatment and safety. Be sure to make and go to all appointments, and call your doctor if you are having problems. It's also a good idea to know your test results and keep a list of the medicines you take. How can you care for yourself at home? · Limit caffeine and other stimulants if they trigger premature heartbeats. · Reduce stress. Avoid people and places that make you feel anxious, if you can. Learn ways to reduce stress, such as biofeedback, guided imagery, and meditation. · Do not smoke or allow others to smoke around you. If you need help quitting, talk to your doctor about stop-smoking programs and medicines. These can increase your chances of quitting for good. · Get at least 30 minutes of exercise on most days of the week. Walking is a good choice. You also may want to do other activities, such as running, swimming, cycling, or playing tennis or team sports. · Eat heart-healthy foods. · Stay at a healthy weight. Lose weight if you need to. · Get enough sleep.  Keep your room dark and quiet, and try to go to bed at the same time every night.  · Limit alcohol to 2 drinks a day for men and 1 drink a day for women. Too much alcohol can cause health problems. If drinking alcohol causes more premature heartbeats, do not drink it. · If your doctor prescribes medicine, take it exactly as prescribed. Call your doctor if you think you are having a problem with your medicine. When should you call for help? Call 911 anytime you think you may need emergency care. For example, call if:    · You passed out (lost consciousness).    Call your doctor now or seek immediate medical care if:    · You are dizzy or lightheaded, or you feel like you may faint.     · You are short of breath.    Watch closely for changes in your health, and be sure to contact your doctor if you have any problems. Where can you learn more? Go to https://[email or web address] Systemseb.Plusmo. org and sign in to your FreeLunched account. Enter H778 in the LearnBoost box to learn more about \"Premature Heartbeat: Care Instructions. \"     If you do not have an account, please click on the \"Sign Up Now\" link. Current as of: July 22, 2018  Content Version: 12.0  © 0560-2567 [employer], [employer]orporated. Care instructions adapted under license by [address] If you have questions about a medical condition or this instruction, always ask your healthcare professional. [address] any warranty or liability for your use of this information.

## 2019-05-20 NOTE — PROGRESS NOTES
Subjective:      Patient ID: Jeanette Ta is a 68 y.o. male. Reason for visit: f/u CAD, PVC's, CM    HPI Jeanette Ta  denies chest pain, palpitations, dizziness, syncope, leg swelling and worsening chronic dyspnea. He states that he is feeling well. He continues to walk 1 mile per day and play golf. Review of Systems   Constitutional: Negative for activity change, appetite change, chills, fatigue, fever and unexpected weight change. HENT: Negative for congestion, nosebleeds and tinnitus. Eyes: Negative for redness and visual disturbance. Respiratory: Negative for cough, shortness of breath and wheezing. Cardiovascular: Negative for chest pain, palpitations and leg swelling. Gastrointestinal: Negative for abdominal distention and blood in stool. Genitourinary: Negative for dysuria and hematuria. Musculoskeletal: Negative for gait problem and myalgias. Neurological: Negative for dizziness and speech difficulty. Hematological: Does not bruise/bleed easily. Psychiatric/Behavioral: Negative for behavioral problems and confusion. All other systems reviewed and are negative. Objective:   Physical Exam   Constitutional: He is oriented to person, place, and time. He appears well-developed and well-nourished. HENT:   Head: Normocephalic and atraumatic. Eyes: Conjunctivae are normal. Right eye exhibits no discharge. Left eye exhibits no discharge. Neck: Normal range of motion. Neck supple. Cardiovascular: Normal rate, regular rhythm, normal heart sounds and intact distal pulses. Occasional ectopy   Pulmonary/Chest: Effort normal.   Diminished bilaterally   Abdominal: Soft. Bowel sounds are normal.   Musculoskeletal: Normal range of motion. He exhibits no edema. Neurological: He is alert and oriented to person, place, and time. Skin: Skin is warm and dry. Psychiatric: He has a normal mood and affect. His behavior is normal.   Nursing note and vitals reviewed.     Blood pressure 115/65, pulse 58, height 5' 7\" (1.702 m), weight 174 lb 12.8 oz (79.3 kg), SpO2 (!) 89 %. Vitals:    05/20/19 1612   BP: 115/65   Site: Left Upper Arm   Position: Sitting   Cuff Size: Medium Adult   Pulse: 58   SpO2: (!) 89%   Weight: 174 lb 12.8 oz (79.3 kg)   Height: 5' 7\" (1.702 m)     Body mass index is 27.38 kg/m². Wt Readings from Last 3 Encounters:   05/20/19 174 lb 12.8 oz (79.3 kg)   09/27/18 168 lb 6.4 oz (76.4 kg)   03/14/18 164 lb (74.4 kg)     BP Readings from Last 3 Encounters:   05/20/19 115/65   09/27/18 110/60   03/14/18 100/64        Vitals:    05/20/19 1612   BP: 115/65   Site: Left Upper Arm   Position: Sitting   Cuff Size: Medium Adult   Pulse: 58   SpO2: (!) 89%   Weight: 174 lb 12.8 oz (79.3 kg)   Height: 5' 7\" (1.702 m)     Body mass index is 27.38 kg/m².      Wt Readings from Last 3 Encounters:   05/20/19 174 lb 12.8 oz (79.3 kg)   09/27/18 168 lb 6.4 oz (76.4 kg)   03/14/18 164 lb (74.4 kg)     BP Readings from Last 3 Encounters:   05/20/19 115/65   09/27/18 110/60   03/14/18 100/64        Current Outpatient Medications   Medication Sig Dispense Refill    furosemide (LASIX) 20 MG tablet Take 20 mg by mouth daily      metoprolol succinate (TOPROL XL) 25 MG extended release tablet TAKE 1 TABLET BY MOUTH  DAILY 90 tablet 3    Tiotropium Bromide-Olodaterol (STIOLTO RESPIMAT) 2.5-2.5 MCG/ACT AERS Inhale 2.5 mcg into the lungs      albuterol (PROVENTIL) (2.5 MG/3ML) 0.083% nebulizer solution Take 2.5 mg by nebulization every 6 hours as needed for Wheezing      albuterol sulfate  (90 Base) MCG/ACT inhaler Inhale 2 puffs into the lungs every 6 hours as needed for Wheezing      clopidogrel (PLAVIX) 75 MG tablet Take 75 mg by mouth daily      levothyroxine (SYNTHROID) 125 MCG tablet Take 125 mcg by mouth once a week 125 mcg 2 days a week, 112 mcg 5 days a week      atorvastatin (LIPITOR) 40 MG tablet Take 40 mg by mouth daily      amLODIPine-benazepril (LOTREL) 5-20 MG per capsule Take 1 capsule by mouth daily      aspirin 81 MG tablet Take 81 mg by mouth daily       No current facility-administered medications for this visit.       Social History     Socioeconomic History    Marital status:      Spouse name: None    Number of children: None    Years of education: None    Highest education level: None   Occupational History    None   Social Needs    Financial resource strain: None    Food insecurity:     Worry: None     Inability: None    Transportation needs:     Medical: None     Non-medical: None   Tobacco Use    Smoking status: Former Smoker     Packs/day: 1.00     Years: 25.00     Pack years: 25.00     Last attempt to quit: 6/15/1995     Years since quittin.9    Smokeless tobacco: Never Used   Substance and Sexual Activity    Alcohol use: Yes     Comment: rarely - maybe q2 months 1 beer    Drug use: No    Sexual activity: None   Lifestyle    Physical activity:     Days per week: None     Minutes per session: None    Stress: None   Relationships    Social connections:     Talks on phone: None     Gets together: None     Attends Gnosticism service: None     Active member of club or organization: None     Attends meetings of clubs or organizations: None     Relationship status: None    Intimate partner violence:     Fear of current or ex partner: None     Emotionally abused: None     Physically abused: None     Forced sexual activity: None   Other Topics Concern    None   Social History Narrative    None     Past Surgical History:   Procedure Laterality Date    ABLATION OF DYSRHYTHMIC FOCUS  2017    Dr. Bret Galdamez TONSILLECTOMY AND ADENOIDECTOMY       Allergies   Allergen Reactions    Tetanus Toxoids Swelling     Family History   Problem Relation Age of Onset    COPD Mother     Heart Attack Father      Recent labs and imaging reviewed. Assessment:       CAD (coronary artery disease)     s/p CABG X4 TCH 1994. Nuc GXT 11/2017 fixed distal anterior, anterior-apical and inferior defect suggestive of infarction, LVEF 40%.  Cardiomy[facility] [address]      Ischemic. Echo 6/2017 LVEF 40-45%, grade I DD, mildly reduced RVSF, mild MR. Echo 3/2018 LVEF 40%, mild MR.    CHF (congestive heart failure) ([facility])     chronic systolic. ProBNP 910 9/2017. proBNP 239 5/2019.  COPD (chronic obstructive pulmonary disease) ([facility]      Followed by Dr. Power Wilson. PFT's 7/2018 very severe obstructive dz, severely reduced diffusion capacity. 02 sat today 89%    HLD (hyperlipidemia)     taking statin. LDL 75 5/2019.  HTN (hypertension)- controlled.  PVC's (premature ventricular contractions)     asymptomatic. Mg, TSH levels normal. K level normal. EKG 10/2017 NSR, bigeminal PVC's. Consider ablation. 24 hour HM 45% PVC burden. S/p PVC ablation by Dr. Geoffrey Ruff 11/2017. TSH normal. Amiodarone stopped. SVT while hospitalized. Plan:       CHF appears to be compensated. No angina. Continue ASA, statin, lasix, Lotrel and BB. Risks and benefits of long term plavix discussed. Pt prefers to continue plavix. Risk factor modification also discussed. Follow up in 6 months. This note was scribed in the presence of the physician by Reshma Pa RN. The scribes documentation has been prepared under my direction and personally reviewed by me in its entirety. I confirm that the note above accurately reflects all work, treatment, procedures, and medical decision making performed by me.

## 2019-09-25 RX ORDER — METOPROLOL SUCCINATE 25 MG/1
25 TABLET, EXTENDED RELEASE ORAL DAILY
Qty: 90 TABLET | Refills: 3 | Status: SHIPPED | OUTPATIENT
Start: 2019-09-25

## 2019-11-25 ENCOUNTER — OFFICE VISIT (OUTPATIENT)
Dept: CARDIOLOGY CLINIC | Age: 74
End: 2019-11-25
Payer: MEDICARE

## 2019-11-25 VITALS
DIASTOLIC BLOOD PRESSURE: 78 MMHG | OXYGEN SATURATION: 97 % | HEIGHT: 67 IN | HEART RATE: 59 BPM | BODY MASS INDEX: 27.59 KG/M2 | SYSTOLIC BLOOD PRESSURE: 108 MMHG | WEIGHT: 175.8 LBS

## 2019-11-25 DIAGNOSIS — I50.22 CHRONIC SYSTOLIC CONGESTIVE HEART FAILURE (HCC): ICD-10-CM

## 2019-11-25 DIAGNOSIS — E78.00 PURE HYPERCHOLESTEROLEMIA: ICD-10-CM

## 2019-11-25 DIAGNOSIS — I25.10 CORONARY ARTERY DISEASE INVOLVING NATIVE CORONARY ARTERY OF NATIVE HEART WITHOUT ANGINA PECTORIS: Primary | ICD-10-CM

## 2019-11-25 PROCEDURE — G8417 CALC BMI ABV UP PARAM F/U: HCPCS | Performed by: INTERNAL MEDICINE

## 2019-11-25 PROCEDURE — G8484 FLU IMMUNIZE NO ADMIN: HCPCS | Performed by: INTERNAL MEDICINE

## 2019-11-25 PROCEDURE — G8427 DOCREV CUR MEDS BY ELIG CLIN: HCPCS | Performed by: INTERNAL MEDICINE

## 2019-11-25 PROCEDURE — 1123F ACP DISCUSS/DSCN MKR DOCD: CPT | Performed by: INTERNAL MEDICINE

## 2019-11-25 PROCEDURE — 4040F PNEUMOC VAC/ADMIN/RCVD: CPT | Performed by: INTERNAL MEDICINE

## 2019-11-25 PROCEDURE — 3017F COLORECTAL CA SCREEN DOC REV: CPT | Performed by: INTERNAL MEDICINE

## 2019-11-25 PROCEDURE — 1036F TOBACCO NON-USER: CPT | Performed by: INTERNAL MEDICINE

## 2019-11-25 PROCEDURE — G8598 ASA/ANTIPLAT THER USED: HCPCS | Performed by: INTERNAL MEDICINE

## 2019-11-25 PROCEDURE — 99214 OFFICE O/P EST MOD 30 MIN: CPT | Performed by: INTERNAL MEDICINE

## 2019-11-25 ASSESSMENT — ENCOUNTER SYMPTOMS
ABDOMINAL DISTENTION: 0
SHORTNESS OF BREATH: 0
EYE REDNESS: 0
BLOOD IN STOOL: 0
WHEEZING: 0
COUGH: 0

## 2020-05-04 RX ORDER — FUROSEMIDE 20 MG/1
TABLET ORAL
Qty: 180 TABLET | Refills: 3 | Status: SHIPPED | OUTPATIENT
Start: 2020-05-04